# Patient Record
Sex: MALE | Race: WHITE | Employment: UNEMPLOYED | URBAN - METROPOLITAN AREA
[De-identification: names, ages, dates, MRNs, and addresses within clinical notes are randomized per-mention and may not be internally consistent; named-entity substitution may affect disease eponyms.]

---
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
## 2023-05-07 NOTE — PROGRESS NOTES
3/22/2019        Dimension 1, 2, 3, 4, 5 and 6  Group Chart Note - Co-facilitated with Chante Isaacs LPC, [facility].    Number of clients attending the group:  7     Michel Rosa attended 0.5 hour Community  group covering the following topics urges to use, daily treatment goal, SIB/SI urges/thoughts, and discussion on how peers and staff could help them be successful today.  Client was Actively participating, Attentive and Engaged.  Client's response:  Ct was active in check in group this morning.   · Continue PPI

## 2023-07-06 NOTE — MR AVS SNAPSHOT
MRN:[id number]                      After Visit Summary   2018    Michel Rosa    MRN: [id number]           Visit Information        Provider Department      2018 8:52 AM Ollie Hsu MD [facility]        Care Instructions             Child and Adolescent Outpatient Discharge Instructions     Name: Michel Rosa MRN: [id number]    : 2000    Discharge Date: 2018    Main Diagnosis:    Major Depressive Disorder, single episode, severe    Post Traumatic Stress Disorder     Cannabis use disorder, moderate dependence     Alcohol Use disorder, mild abuse    Major Treatments, Procedures and Findings:    See therapist discharge summary     Current Outpatient Prescriptions   Medication Sig     prazosin (MINIPRESS) 2 MG capsule Take 1 capsule (2 mg) by mouth At Bedtime     sertraline (ZOLOFT) 50 MG tablet Take 3 tablets (150 mg) by mouth daily     MELATONIN PO Take 5-10 mg by mouth nightly as needed         Prescriptions sent home at Discharge  Mode sent (i.e. script, print, e-prescribe)   Prazosin as written above  E-prescribe to [facility]    Sertraline as written above  E-prescribe to [facility]                      Notes:    Take all medicines as directed. Make no changes unless your doctor suggests them.    Go to all your doctor visits. Be sure to have all your required lab tests. This way, your medicines can be refilled.    Do not use any drugs not prescribed by your doctor. Avoid alcohol.    Special Care Needs:    If you experience any of the following symptom(s), increased confusion, mood getting worse, feeling more aggressive, losing more sleep and thoughts of suicide report them to your doctor or therapist      Adjust your lifestyle so you get enough sleep, relaxation, exercise and nutrition.    Psychiatry Follow-up  Psychiatrist / Main Caregiver:    Bertha Clancy in [place]     Therapist:    Joyce Stevenson.  Next appointment is on 7/11 @ 9:00[facility] :    Joelle Lundberg    Crisis Intervention:    [phone] or [phone] (TTY: [phone]); call anytime for help    National [place] on Mental Illness (www.[place].carlos.org):    360.231.3093 or 464-219-5069    [place] Association of Children's Mental Health (www.macmh.org):    507.514.9538    Alcoholics Anonymous (www.alcoholics-anonymous.org):    Check your phone book for your local chapter    Suicide Awareness Voices of Education (SAVE) (www.save.org):    563-510-GYAP [5788]    National Suicide Prevention Line (www.mentalHorizon Studiosmn.org):    399-380-IIXR [0751]    Mental Health Consumer / Survivor Network of [place] (www.mhcsn.net):    114.211.1144 or 009-286-2061    Mental Health Association [place] (www.mentalhealth.org):    285.570.6419 or 561-121-8224    Provider Information    Discharged from:   [facility]. Unit: [address] [place] [address]  Phone: [phone]      Method of discharge:   Ambulatory      Discharged to:   Home - .      Discharge teachings:   Patient / family understands purpose  / diagnosis for this admission and what treatment consisted of., Patient / family can identify whom to call for questions after discharge., Patient / family can identify potential community resources after discharge., Patient / family states reasons for or demonstrates ability to manage medications and side effects., Patient / family understands how to care for self (i.e., pain management, diet change, activity) or who will be responsible for their care upon discharge., Patient / family is aware of drug / food interactions for prescribed medication., Patient / family is aware of adverse side effects of medication and when to contact the doctor. and Patient / family knows who / where to go for medication refills.    Valuables:  Have been returned to the patient.    Medications:  Have been returned to the  patient.      Discharge Signatures:  Patient / Family Member- Rajesh Rosa   Program - [facility]   Discharge Nurse: Lilly Ramirez RN BSN PHN Date: 7/6/2018 Time:    Discharging Physician Name (printed) Dr. Ollie Hsu MD             [employer] Information     [employer] lets you send messages to your doctor, view your test results, renew your prescriptions, schedule appointments and more. To sign up, go to [email or web address], contact your [facility] clinic or call [phone] during business hours.            Care EveryWhere ID     This is your Care EveryWhere ID. This could be used by other organizations to access your [facility] medical records  [id number]        Equal Access to Services     ESTHER SALINAS : Thomas Ariza, waaxda grayson, qaybta kaalmada sam, ryne martini. So [facility] [phone].    ATENCIÓN: Si habla español, tiene a lew disposición servicios gratuitos de asistencia lingüística. Llame al [phone].    We comply with applicable federal civil rights laws and [place] laws. We do not discriminate on the basis of race, color, national origin, age, disability, sex, sexual orientation, or gender identity.             social work